# Patient Record
(demographics unavailable — no encounter records)

---

## 2025-01-23 NOTE — REASON FOR VISIT
[Routine Follow-Up] : routine follow-up visit for [Brain Tumor] : brain tumor [Other: ___] : [unfilled] [Other: _____] : [unfilled]

## 2025-01-29 NOTE — REVIEW OF SYSTEMS
[Disturbance Of Gait] : gait disturbance [Difficulty Walking] : difficulty walking [Negative] : Endocrine [Dizziness] : dizziness [FreeTextEntry3] : blurry vision LEFT improved [FreeTextEntry4] : mild hearing loss LEFT [de-identified] : mild imbalance stable

## 2025-01-29 NOTE — PHYSICAL EXAM
[General Appearance - Well Developed] : well developed [Extraocular Movements] : extraocular movements were intact [Examination Of The Oral Cavity] : the lips and gums were normal [] : no respiratory distress [Normal] : normal skin color and pigmentation and no rash [No Focal Deficits] : no focal deficits [Oriented To Time, Place, And Person] : oriented to person, place, and time [de-identified] : no facial asymmetry appreciated

## 2025-01-29 NOTE — VITALS
[100: Normal, no complaints, no evidence of disease.] : 100: Normal, no complaints, no evidence of disease. [Date: ____________] : Patient's last distress assessment performed on [unfilled]. [1 - Distress Level] : Distress Level: 1 [Maximal Pain Intensity: 0/10] : 0/10 [Least Pain Intensity: 0/10] : 0/10

## 2025-01-29 NOTE — HISTORY OF PRESENT ILLNESS
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 Abhi Carpenter neurosurgeon.  The course of his illness began when he completed cranial imaging for LEFT sided headaches, which have been progressively worsening over the past 3 months for which he noticed   needing more OTC NSAIDS to get symptom relief.  Accompanied by Intermittent dizziness/imbalance. Also noted visual disturbance left lower visual field described as blurry Reports seeking an evaluation from an ophthalmologist who thought he had a hemorrhage which was treated with injections, but the name of the medication is unknown.  Cranial imaging completed appreciated a LEFT CP angle mass and ventriculomegaly. This prompted as visit to [facility] during his hospitalization patient also completed CT C/A/P 5/29/2024 which showed a 5mm pulmonary nodule in left lower lobe that with no definite evidence for malignancy within chest, abdomen or pelvis.   Inpatient Neuro-opthal workup w/o evidence of papillary edema. Neurosurgery recommendation for GKRS and patient was discharged w/o evidence for skilled needs. Scheduled for otolaryngology consult with Dr. Larsen on 8/14/2024  Today reports doing well. 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 Balance is improving but not back to baseline. Denies subjective LEFT hearing loss. Notices now he needs his reading glasses d/t his vision still being blurry. Denies facial weakness/weakness/numbness/pian/changes in his voice/difficulty swallowing. Endorses +tinnitus LEFT ear which seems a bit increased.  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 Contrast enhanced MR imaging is available for presurgical planning.  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 Needed DEX post treatment which did help with dizziness/imbalance/increased tinnitus. Today reports slight daily headaches, dizziness/blurry vision, tinnitus persists and varies in intensity. Also notices intermittent imbalance. Thinks the tinnitus and headache is most bothersome uses Excedrin which give minimal relief (3 tabs). Hearing remains intact. Denies facial weakness/pain/numbness/otalgia/otorrhea/dizziness/vertigo.  VISIT DATED: 1/29/2025 Mr. Nance presents for continued care with completed cranial images for review. States doing well overall still with periods of dizziness/mild imbalance. Tinnitus ranges in intensity but this is tolerating. Pre/post treatment headaches have resolved. States hearing in LEFT ear but thinks it may have slightly declined. LEFT eye blurry vision improved continues to get Cimerli injections Denies facial weakness/pain/numbness  MRI brain w w/o contrast 1/25/2025   IMPRESSION: Heterogeneous left CP angle mass appears stable in size with stable mass effect. New dural thickening and enhancement along the left temporal parietal convexity measuring up to 4 mm in thickness. Interval follow-up recommended

## 2025-01-29 NOTE — PHYSICAL EXAM
[General Appearance - Well Developed] : well developed [Extraocular Movements] : extraocular movements were intact [Examination Of The Oral Cavity] : the lips and gums were normal [] : no respiratory distress [Normal] : normal skin color and pigmentation and no rash [No Focal Deficits] : no focal deficits [Oriented To Time, Place, And Person] : oriented to person, place, and time [de-identified] : no facial asymmetry appreciated

## 2025-01-29 NOTE — REVIEW OF SYSTEMS
[Disturbance Of Gait] : gait disturbance [Difficulty Walking] : difficulty walking [Negative] : Endocrine [Dizziness] : dizziness [FreeTextEntry3] : blurry vision LEFT improved [FreeTextEntry4] : mild hearing loss LEFT [de-identified] : mild imbalance stable

## 2025-01-29 NOTE — HISTORY OF PRESENT ILLNESS
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 Abhi Carpenter neurosurgeon.  The course of his illness began when he completed cranial imaging for LEFT sided headaches, which have been progressively worsening over the past 3 months for which he noticed   needing more OTC NSAIDS to get symptom relief.  Accompanied by Intermittent dizziness/imbalance. Also noted visual disturbance left lower visual field described as blurry Reports seeking an evaluation from an ophthalmologist who thought he had a hemorrhage which was treated with injections, but the name of the medication is unknown.  Cranial imaging completed appreciated a LEFT CP angle mass and ventriculomegaly. This prompted as visit to [facility] during his hospitalization patient also completed CT C/A/P 5/29/2024 which showed a 5mm pulmonary nodule in left lower lobe that with no definite evidence for malignancy within chest, abdomen or pelvis.   Inpatient Neuro-opthal workup w/o evidence of papillary edema. Neurosurgery recommendation for GKRS and patient was discharged w/o evidence for skilled needs. Scheduled for otolaryngology consult with Dr. Larsen on 8/14/2024  Today reports doing well. 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 Balance is improving but not back to baseline. Denies subjective LEFT hearing loss. Notices now he needs his reading glasses d/t his vision still being blurry. Denies facial weakness/weakness/numbness/pian/changes in his voice/difficulty swallowing. Endorses +tinnitus LEFT ear which seems a bit increased.  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 Contrast enhanced MR imaging is available for presurgical planning.  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 Needed DEX post treatment which did help with dizziness/imbalance/increased tinnitus. Today reports slight daily headaches, dizziness/blurry vision, tinnitus persists and varies in intensity. Also notices intermittent imbalance. Thinks the tinnitus and headache is most bothersome uses Excedrin which give minimal relief (3 tabs). Hearing remains intact. Denies facial weakness/pain/numbness/otalgia/otorrhea/dizziness/vertigo.  VISIT DATED: 1/29/2025 Mr. Nance presents for continued care with completed cranial images for review. States doing well overall still with periods of dizziness/mild imbalance. Tinnitus ranges in intensity but this is tolerating. Pre/post treatment headaches have resolved. States hearing in LEFT ear but thinks it may have slightly declined. LEFT eye blurry vision improved continues to get Cimerli injections Denies facial weakness/pain/numbness  MRI brain w w/o contrast 1/25/2025   IMPRESSION: Heterogeneous left CP angle mass appears stable in size with stable mass effect. New dural thickening and enhancement along the left temporal parietal convexity measuring up to 4 mm in thickness. Interval follow-up recommended

## 2025-05-13 NOTE — VITALS
[Maximal Pain Intensity: 2/10] : 2/10 [Least Pain Intensity: 1/10] : 1/10 [Pain Location: ___] : Pain Location: [unfilled] [90: Able to carry normal activity; minor signs or symptoms of disease.] : 90: Able to carry normal activity; minor signs or symptoms of disease.

## 2025-05-13 NOTE — HISTORY OF PRESENT ILLNESS
[Home] : at home, [unfilled] , at the time of the visit. [Medical Office: ([facility])___] : at the medical office located in  [Telehealth (audio & video)] : This visit was provided via telehealth using real-time 2-way audio visual technology. 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 Abhi Carpenter neurosurgeon.  The course of his illness began when he completed cranial imaging for LEFT sided headaches, which have been progressively worsening over the past 3 months for which he noticed   needing more OTC NSAIDS to get symptom relief.  Accompanied by Intermittent dizziness/imbalance. Also noted visual disturbance left lower visual field described as blurry Reports seeking an evaluation from an ophthalmologist who thought he had a hemorrhage which was treated with injections, but the name of the medication is unknown.  Cranial imaging completed appreciated a LEFT CP angle mass and ventriculomegaly. This prompted as visit to [facility] during his hospitalization patient also completed CT C/A/P 5/29/2024 which showed a 5mm pulmonary nodule in left lower lobe that with no definite evidence for malignancy within chest, abdomen or pelvis.   Inpatient Neuro-opthal workup w/o evidence of papillary edema. Neurosurgery recommendation for GKRS and patient was discharged w/o evidence for skilled needs. Scheduled for otolaryngology consult with Dr. Larsen on 8/14/2024  Today reports doing well. 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 Balance is improving but not back to baseline. Denies subjective LEFT hearing loss. Notices now he needs his reading glasses d/t his vision still being blurry. Denies facial weakness/weakness/numbness/pian/changes in his voice/difficulty swallowing. Endorses +tinnitus LEFT ear which seems a bit increased.  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 Contrast enhanced MR imaging is available for presurgical planning.  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 Needed DEX post treatment which did help with dizziness/imbalance/increased tinnitus. Today reports slight daily headaches, dizziness/blurry vision, tinnitus persists and varies in intensity. Also notices intermittent imbalance. Thinks the tinnitus and headache is most bothersome uses Excedrin which give minimal relief (3 tabs). Hearing remains intact. Denies facial weakness/pain/numbness/otalgia/otorrhea/dizziness/vertigo.  VISIT DATED: 1/29/2025 Mr. Nance presents for continued care with completed cranial images for review. States doing well overall still with periods of dizziness/mild imbalance. Tinnitus ranges in intensity but this is tolerating. Pre/post treatment headaches have resolved. States hearing in LEFT ear but thinks it may have slightly declined. LEFT eye blurry vision improved continues to get Cimerli injections Denies facial weakness/pain/numbness  MRI brain w w/o contrast 1/25/2025   IMPRESSION: Heterogeneous left CP angle mass appears stable in size with stable mass effect. New dural thickening and enhancement along the left temporal parietal convexity measuring up to 4 mm in thickness. Interval follow-up recommended  VSIT DATED: 5/13/2025 Mr. Nance presents for follow-up. He has completed cranial images to assess the extent of treatment changes in lieu of increased dizziness/headaches/nausea over the past 4 weeks. Denies recent sick contacts.  He completed GKRS to LEFT KOOS 4 vestibular schwannoma 25GY over 5 Fxs 7/10-7/18/2024.  States he only utilized 3 tabs of DEX because after the first dose he noticed hematuria.  Of note he does have a h/o bladder CA and his being followed by oncology/urology. Today his symptoms are better than 4 weeks ago. Does mention the pain in the head starts at the base of the neck on the left side and radiates to the lateral neck to head. Pain at this time is negligible 1/10. Tinnitus persists and varies in frequency.  Balance mildly unstable and doesn't affect ADLs. Denies facial weakness/pain/numbness.  MRI brain w w/o contrast 5/9/2025 final read pending at time of this entry.

## 2025-05-13 NOTE — PHYSICAL EXAM
[General Appearance - Alert] : alert [General Appearance - In No Acute Distress] : in no acute distress [] : no respiratory distress [Oriented To Time, Place, And Person] : oriented to person, place, and time [de-identified] : EXAM LIMITED visit TELEHEALTH

## 2025-05-13 NOTE — PHYSICAL EXAM
[General Appearance - Alert] : alert [General Appearance - In No Acute Distress] : in no acute distress [] : no respiratory distress [Oriented To Time, Place, And Person] : oriented to person, place, and time [de-identified] : EXAM LIMITED visit TELEHEALTH

## 2025-05-13 NOTE — REVIEW OF SYSTEMS
[Disturbance Of Gait] : gait disturbance [Dizziness] : dizziness [Negative] : Constitutional [FreeTextEntry3] : left ee blurry vision at baseline gets injections [FreeTextEntry4] : LEFT side hearing loss, tinnitus [FreeTextEntry8] : bladder CA

## 2025-05-13 NOTE — HISTORY OF PRESENT ILLNESS
[Home] : at home, [unfilled] , at the time of the visit. [Medical Office: ([facility])___] : at the medical office located in  [Telehealth (audio & video)] : This visit was provided via telehealth using real-time 2-way audio visual technology. [Verbal consent obtained from patient] : the patient, [unfilled] [FreeTextEntry1] :   RT hx: GKRS to LEFT KOOS 4 vestibular schwannoma 25GY over 5 Fxs 7/10-7/18/2024  INITIAL CONSULTATION: JUNE 13, 2024 This is a 66 y/o M with a h/p bladder CA, HTN, HLD who presents to discuss the role of radiation therapy in his care on the recommendation of Dr. Abhi Carpenter neurosurgeon.  The course of his illness began when he completed cranial imaging for LEFT sided headaches, which have been progressively worsening over the past 3 months for which he noticed   needing more OTC NSAIDS to get symptom relief.  Accompanied by Intermittent dizziness/imbalance. Also noted visual disturbance left lower visual field described as blurry Reports seeking an evaluation from an ophthalmologist who thought he had a hemorrhage which was treated with injections, but the name of the medication is unknown.  Cranial imaging completed appreciated a LEFT CP angle mass and ventriculomegaly. This prompted as visit to [facility] during his hospitalization patient also completed CT C/A/P 5/29/2024 which showed a 5mm pulmonary nodule in left lower lobe that with no definite evidence for malignancy within chest, abdomen or pelvis.   Inpatient Neuro-opthal workup w/o evidence of papillary edema. Neurosurgery recommendation for GKRS and patient was discharged w/o evidence for skilled needs. Scheduled for otolaryngology consult with Dr. Larsen on 8/14/2024  Today reports doing well. 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 Balance is improving but not back to baseline. Denies subjective LEFT hearing loss. Notices now he needs his reading glasses d/t his vision still being blurry. Denies facial weakness/weakness/numbness/pian/changes in his voice/difficulty swallowing. Endorses +tinnitus LEFT ear which seems a bit increased.  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 Contrast enhanced MR imaging is available for presurgical planning.  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 Needed DEX post treatment which did help with dizziness/imbalance/increased tinnitus. Today reports slight daily headaches, dizziness/blurry vision, tinnitus persists and varies in intensity. Also notices intermittent imbalance. Thinks the tinnitus and headache is most bothersome uses Excedrin which give minimal relief (3 tabs). Hearing remains intact. Denies facial weakness/pain/numbness/otalgia/otorrhea/dizziness/vertigo.  VISIT DATED: 1/29/2025 Mr. Nance presents for continued care with completed cranial images for review. States doing well overall still with periods of dizziness/mild imbalance. Tinnitus ranges in intensity but this is tolerating. Pre/post treatment headaches have resolved. States hearing in LEFT ear but thinks it may have slightly declined. LEFT eye blurry vision improved continues to get Cimerli injections Denies facial weakness/pain/numbness  MRI brain w w/o contrast 1/25/2025   IMPRESSION: Heterogeneous left CP angle mass appears stable in size with stable mass effect. New dural thickening and enhancement along the left temporal parietal convexity measuring up to 4 mm in thickness. Interval follow-up recommended  VSIT DATED: 5/13/2025 Mr. Nance presents for follow-up. He has completed cranial images to assess the extent of treatment changes in lieu of increased dizziness/headaches/nausea over the past 4 weeks. Denies recent sick contacts.  He completed GKRS to LEFT KOOS 4 vestibular schwannoma 25GY over 5 Fxs 7/10-7/18/2024.  States he only utilized 3 tabs of DEX because after the first dose he noticed hematuria.  Of note he does have a h/o bladder CA and his being followed by oncology/urology. Today his symptoms are better than 4 weeks ago. Does mention the pain in the head starts at the base of the neck on the left side and radiates to the lateral neck to head. Pain at this time is negligible 1/10. Tinnitus persists and varies in frequency.  Balance mildly unstable and doesn't affect ADLs. Denies facial weakness/pain/numbness.  MRI brain w w/o contrast 5/9/2025 final read pending at time of this entry.